# Patient Record
Sex: FEMALE | Race: OTHER | NOT HISPANIC OR LATINO
[De-identification: names, ages, dates, MRNs, and addresses within clinical notes are randomized per-mention and may not be internally consistent; named-entity substitution may affect disease eponyms.]

---

## 2019-06-01 PROBLEM — Z00.00 ENCOUNTER FOR PREVENTIVE HEALTH EXAMINATION: Status: ACTIVE | Noted: 2019-06-01

## 2019-06-04 ENCOUNTER — APPOINTMENT (OUTPATIENT)
Dept: ORTHOPEDIC SURGERY | Facility: CLINIC | Age: 39
End: 2019-06-04
Payer: MEDICAID

## 2019-06-04 VITALS — HEIGHT: 65 IN | WEIGHT: 145 LBS | BODY MASS INDEX: 24.16 KG/M2

## 2019-06-04 VITALS — DIASTOLIC BLOOD PRESSURE: 61 MMHG | HEART RATE: 72 BPM | SYSTOLIC BLOOD PRESSURE: 94 MMHG

## 2019-06-04 DIAGNOSIS — Z78.9 OTHER SPECIFIED HEALTH STATUS: ICD-10-CM

## 2019-06-04 DIAGNOSIS — M67.432 GANGLION, LEFT WRIST: ICD-10-CM

## 2019-06-04 PROCEDURE — 99204 OFFICE O/P NEW MOD 45 MIN: CPT

## 2019-06-05 PROBLEM — M67.432 GANGLION CYST OF DORSUM OF LEFT WRIST: Status: ACTIVE | Noted: 2019-06-04

## 2019-06-05 NOTE — ASSESSMENT
[FreeTextEntry1] : 39F with L wrist pain likely 2/2 occult ganglion cyst.  Risks and benefits of conservative tx vs US guided aspiration vs surgical excision were discussed with the pt, at this time she would like to proceed with aspiration.  She will follow-up if this does not alleviate her symptoms.

## 2019-06-05 NOTE — CONSULT LETTER
[Consult Letter:] : I had the pleasure of evaluating your patient, [unfilled]. [Please see my note below.] : Please see my note below. [Consult Closing:] : Thank you very much for allowing me to participate in the care of this patient.  If you have any questions, please do not hesitate to contact me. [Sincerely,] : Sincerely, [FreeTextEntry3] : Dr. Fay Pack\par Orthopaedic Surgery\par Hand & Upper Extremity.\par

## 2019-06-05 NOTE — PHYSICAL EXAM
[Normal LUE] : Left Upper Extremity: No scars, rashes, lesions, ulcers, skin intact [Normal Finger/nose] : finger to nose coordination [de-identified] : diogor [de-identified] : L wrist :\par skin intact no swelling no erythema no eccymosis\par TTP at the SL interval, no palpable mass\par ROM intact, pain with full wrist extension\par No TTP at the snuffbox or fovea\par ROM digits intact\par sensation intact, capillary refill < 2 sec [Normal] : no peripheral adenopathy appreciated [de-identified] : MRI - partial SL ligament tear with dorsal ganglion cyst

## 2019-06-05 NOTE — HISTORY OF PRESENT ILLNESS
[FreeTextEntry1] : 39F with several months left dorsal wrist pain, no trauma or inciting event.  Her pain is dull in nature localized to the SL interval, worse with activity and improved with rest.  She had a recent MRI which was positive for occult ganglion cyst at the dorsal SL interval, no full thickness tear.

## 2019-06-30 ENCOUNTER — TRANSCRIPTION ENCOUNTER (OUTPATIENT)
Age: 39
End: 2019-06-30

## 2019-07-01 ENCOUNTER — RESULT REVIEW (OUTPATIENT)
Age: 39
End: 2019-07-01

## 2019-07-01 ENCOUNTER — OUTPATIENT (OUTPATIENT)
Dept: OUTPATIENT SERVICES | Facility: HOSPITAL | Age: 39
LOS: 1 days | End: 2019-07-01
Payer: MEDICAID

## 2019-07-01 DIAGNOSIS — K29.60 OTHER GASTRITIS WITHOUT BLEEDING: ICD-10-CM

## 2019-07-01 DIAGNOSIS — K21.9 GASTRO-ESOPHAGEAL REFLUX DISEASE WITHOUT ESOPHAGITIS: ICD-10-CM

## 2019-07-01 DIAGNOSIS — Z86.010 PERSONAL HISTORY OF COLONIC POLYPS: ICD-10-CM

## 2019-07-01 LAB — HCG UR QL: NEGATIVE — SIGNIFICANT CHANGE UP

## 2019-07-01 PROCEDURE — 88313 SPECIAL STAINS GROUP 2: CPT

## 2019-07-01 PROCEDURE — 88312 SPECIAL STAINS GROUP 1: CPT | Mod: 26

## 2019-07-01 PROCEDURE — 88312 SPECIAL STAINS GROUP 1: CPT

## 2019-07-01 PROCEDURE — 81025 URINE PREGNANCY TEST: CPT

## 2019-07-01 PROCEDURE — 88313 SPECIAL STAINS GROUP 2: CPT | Mod: 26

## 2019-07-01 PROCEDURE — 88305 TISSUE EXAM BY PATHOLOGIST: CPT

## 2019-07-01 PROCEDURE — 45380 COLONOSCOPY AND BIOPSY: CPT | Mod: PT

## 2019-07-01 PROCEDURE — 43239 EGD BIOPSY SINGLE/MULTIPLE: CPT

## 2019-07-01 PROCEDURE — 88305 TISSUE EXAM BY PATHOLOGIST: CPT | Mod: 26

## 2019-07-02 LAB — SURGICAL PATHOLOGY STUDY: SIGNIFICANT CHANGE UP

## 2019-07-08 ENCOUNTER — APPOINTMENT (OUTPATIENT)
Dept: ORTHOPEDIC SURGERY | Facility: CLINIC | Age: 39
End: 2019-07-08
Payer: MEDICAID

## 2019-07-08 VITALS
DIASTOLIC BLOOD PRESSURE: 72 MMHG | HEIGHT: 65 IN | HEART RATE: 76 BPM | WEIGHT: 145 LBS | SYSTOLIC BLOOD PRESSURE: 106 MMHG | BODY MASS INDEX: 24.16 KG/M2

## 2019-07-08 PROCEDURE — 73562 X-RAY EXAM OF KNEE 3: CPT | Mod: 50

## 2019-07-08 PROCEDURE — 99214 OFFICE O/P EST MOD 30 MIN: CPT

## 2019-07-08 NOTE — DISCUSSION/SUMMARY
[de-identified] : Today I had a lengthy discussion with the patient regarding their BL knee pain.I have addressed all the patient's concerns surrounding the pathology of their condition. The patient should continue to stretch and utilize anti-inflammatory medicines such as Motrin, Advil, and Aleve 3 times a day. I recommend the patient undergo a course of physical therapy for the BL knees 2-3 times a week for a total of 6-8 weeks. A prescription was given for the physical therapy today. A discussion was had about a possible future cortisone injection if there is no improvement after physical therapy. I would like to see the patient back in the office in 6-8 weeks to reassess their condition.  The patient understood and verbally agreed to the treatment plan. All of their questions were answered and they were satisfied with the visit. The patient should call the office if they have any questions or experience worsening symptoms. \par

## 2019-07-08 NOTE — CONSULT LETTER
[Consult Letter:] : I had the pleasure of evaluating your patient, [unfilled]. [Consult Closing:] : Thank you very much for allowing me to participate in the care of this patient.  If you have any questions, please do not hesitate to contact me. [Sincerely,] : Sincerely, [Please see my note below.] : Please see my note below. [FreeTextEntry3] : Alejandro Ortiz, DO\par Foot and Ankle Surgery\par

## 2019-07-08 NOTE — PHYSICAL EXAM
[de-identified] : General: Alert and oriented x3. In no acute distress. Pleasant in nature with a normal affect. No apparent respiratory distress.\par \par L Knee Exam\par \par Skin: Clean, dry, intact\par Inspection: No obvious malalignment, no masses, no swelling, no effusion\par Pulses: 2+ DP/PT pulses\par ROM: Left 0-130 degrees of flexion. No pain with deep knee flexion. \par Tenderness: No MJLT. No LJLT. No pain over the patella facets. No pain to the quadriceps mechanism.\par Stability: Stable to varus, valgus, lachman testing. Negative anterior/posterior drawer.\par Strength: 5/5 Q/H/TA/GS/EHL, no atrophy\par Neuro: In tact to light touch throughout, DTR's normal\par Additional tests: Negative McMurrays test, Negative patellar grind test.\par \par R Knee Exam\par \par Skin: Clean, dry, intact\par Inspection: No obvious malalignment, no masses, no swelling, no effusion\par Pulses: 2+ DP/PT pulses\par ROM: Right 0-130 degrees of flexion. No pain with deep knee flexion. \par Tenderness: No MJLT. No LJLT. No pain over the patella facets. No pain to the quadriceps mechanism.\par Stability: Stable to varus, valgus, lachman testing. Negative anterior/posterior drawer.\par Strength: 5/5 Q/H/TA/GS/EHL, no atrophy\par Neuro: In tact to light touch throughout, DTR's normal\par Additional tests: Negative McMurrays test, Negative patellar grind test.\par \par \par \par   [de-identified] : 3V of the BL knee were ordered obtained and reviewed by me today, 07/08/2019 , revealed: no fx\par \par \par

## 2019-07-08 NOTE — ADDENDUM
[FreeTextEntry1] : I, Low Isiah, acted solely as a scribe for Dr. Alejandro Ortiz on this date 07/08/2019 .\par All medical record entries made by the Scribe were at my, Dr. Alejandro Ortiz, direction and personally dictated by me on 07/08/2019 . I have reviewed the chart and agree that the record accurately reflects my personal performance of the history, physical exam, assessment and plan. I have also personally directed, reviewed, and agreed with the chart.\par \par \par

## 2019-07-08 NOTE — HISTORY OF PRESENT ILLNESS
[de-identified] : 39 year year old female presenting with BL knee pain. The patient’s pain is noted to be a 6-7/10. The pain is noted to be worse in the right knee. The patient states her pain is been occurring for over a year. The patient is accompanied by her , mother, and three daughters. She is currently taking no pain medication. No other complaints at this time. \par

## 2021-02-03 ENCOUNTER — APPOINTMENT (OUTPATIENT)
Dept: ORTHOPEDIC SURGERY | Facility: CLINIC | Age: 41
End: 2021-02-03
Payer: MEDICAID

## 2021-02-03 DIAGNOSIS — M54.5 LOW BACK PAIN: ICD-10-CM

## 2021-02-03 DIAGNOSIS — M25.562 PAIN IN RIGHT KNEE: ICD-10-CM

## 2021-02-03 DIAGNOSIS — M22.2X1 PATELLOFEMORAL DISORDERS, RIGHT KNEE: ICD-10-CM

## 2021-02-03 DIAGNOSIS — M22.2X2 PATELLOFEMORAL DISORDERS, RIGHT KNEE: ICD-10-CM

## 2021-02-03 DIAGNOSIS — M25.561 PAIN IN RIGHT KNEE: ICD-10-CM

## 2021-02-03 PROCEDURE — 99072 ADDL SUPL MATRL&STAF TM PHE: CPT

## 2021-02-03 PROCEDURE — 72100 X-RAY EXAM L-S SPINE 2/3 VWS: CPT

## 2021-02-03 PROCEDURE — 73562 X-RAY EXAM OF KNEE 3: CPT | Mod: RT

## 2021-02-03 PROCEDURE — 73610 X-RAY EXAM OF ANKLE: CPT | Mod: RT

## 2021-02-03 PROCEDURE — 99214 OFFICE O/P EST MOD 30 MIN: CPT

## 2021-02-03 RX ORDER — AZITHROMYCIN 250 MG/1
250 TABLET, FILM COATED ORAL
Qty: 6 | Refills: 0 | Status: ACTIVE | COMMUNITY
Start: 2021-01-01

## 2021-02-03 RX ORDER — MUPIROCIN 20 MG/G
2 OINTMENT TOPICAL
Qty: 22 | Refills: 0 | Status: ACTIVE | COMMUNITY
Start: 2021-01-02

## 2021-02-03 NOTE — ADDENDUM
[FreeTextEntry1] : I, Low Joyce, acted solely as a scribe for Dr. Alejandro Ortiz on this date 02/03/2021 .\par All medical record entries made by the Scribe were at my, Dr. Alejandro Ortiz, direction and personally dictated by me on 02/03/2021 . I have reviewed the chart and agree that the record accurately reflects my personal performance of the history, physical exam, assessment and plan. I have also personally directed, reviewed, and agreed with the chart.

## 2021-02-03 NOTE — PHYSICAL EXAM
[de-identified] : General: Alert and oriented x3. In no acute distress. Pleasant in nature with a normal affect. No apparent respiratory distress.\par \par R Knee Exam\par Skin: Clean, dry, intact\par Inspection: No obvious malalignment, no masses, no swelling, no effusion\par Pulses: 2+ DP/PT pulses\par ROM: R Knee 0-130 degrees of flexion. No pain with deep knee flexion.\par Tenderness: No MJLT. No LJLT. No pain over the patella facets. No pain to the quadriceps mechanism.\par Stability: Stable to varus, valgus, lachman testing. Negative anterior/posterior drawer.\par Strength: 5/5 Q/H/TA/GS/EHL, no atrophy\par Neuro: In tact to light touch throughout, DTR's normal\par Additional tests: Negative McMurrays test, Negative patellar grind test.  [de-identified] : 3V of the right knee were ordered obtained and reviewed by me today, 02/03/2021 , revealed: No significant abnormality\par \par 3V of the right ankle were ordered obtained and reviewed by me today, 02/03/2021 , revealed: No significant abnormality\par \par 2V of the lumbar spine were ordered obtained and reviewed by me today, 02/03/2021 , revealed: No significant abnormality

## 2021-02-03 NOTE — DISCUSSION/SUMMARY
[de-identified] : Today I had a lengthy discussion with the patient regarding their right knee pain. I have addressed all the patient's concerns surrounding the pathology of their condition. I recommend the patient undergo a course of physical therapy for the right knee 2-3 times a week for a total of 6-8 weeks. A prescription was given for the physical therapy today. I recommend that the patient utilize 15 mg meloxicam with food once per day as instructed. A prescription for the meloxicam was ordered for the patient in the office today. I advised the patient to follow up with a physiatrist as well for further evaluation. I would like to see the patient back in the office in 2-3 months to reassess their condition. The patient understood and verbally agreed to the treatment plan. All of their questions were answered and they were satisfied with the visit. The patient should call the office if they have any questions or experience worsening symptoms.

## 2021-02-03 NOTE — HISTORY OF PRESENT ILLNESS
[de-identified] : 40 year old female presenting with right knee pain. The patient is accompanied by her . The patient’s pain is noted to be a 8/10. The pain is noted to be worse compared to her previous visit. The patient cannot attribute their pain to any specific injury, fall, or trauma. She c/o cramping, and pain radiating down her lower back/buttock to her ankle. She reports a burning sensation in the ankle. She is currently taking no pain medication. No other complaints at this time.

## 2021-02-25 ENCOUNTER — RX RENEWAL (OUTPATIENT)
Age: 41
End: 2021-02-25

## 2021-02-25 RX ORDER — MELOXICAM 15 MG/1
15 TABLET ORAL
Qty: 30 | Refills: 0 | Status: ACTIVE | COMMUNITY
Start: 2021-02-03 | End: 1900-01-01

## 2021-03-24 ENCOUNTER — RX RENEWAL (OUTPATIENT)
Age: 41
End: 2021-03-24

## 2024-03-28 ENCOUNTER — APPOINTMENT (OUTPATIENT)
Dept: ORTHOPEDIC SURGERY | Facility: CLINIC | Age: 44
End: 2024-03-28
Payer: MEDICAID

## 2024-03-28 VITALS — HEIGHT: 65 IN | WEIGHT: 177 LBS | BODY MASS INDEX: 29.49 KG/M2

## 2024-03-28 DIAGNOSIS — M23.91 UNSPECIFIED INTERNAL DERANGEMENT OF RIGHT KNEE: ICD-10-CM

## 2024-03-28 PROCEDURE — 99204 OFFICE O/P NEW MOD 45 MIN: CPT

## 2024-03-28 PROCEDURE — 73564 X-RAY EXAM KNEE 4 OR MORE: CPT | Mod: RT

## 2024-03-28 NOTE — IMAGING
[de-identified] : RIGHT KNEE No Effusion +Medial joint line tenderness +PF tenderness ROM 0-120 5/5 Strength NVI  Non-antalgic gait w/o assistance

## 2024-03-28 NOTE — HISTORY OF PRESENT ILLNESS
[8] : 8 [Intermittent] : intermittent [Rest] : rest [Meds] : meds [Walking] : walking [Full time] : Work status: full time [de-identified] : 3/28/24: 44yo F with right knee pain that has been gradually worsening over the past few months with no injury. She states she went to ortho ~7 yrs ago and was told she had OA. No prior inj. Had done HEP and meds.  [] : no [FreeTextEntry1] : Right Knee [FreeTextEntry5] : Approx 7yrs ago went to Orthopedic and was told her Rt. Knee is bone on bone, pt doesn't recall where she went to [FreeTextEntry6] : Popping noise [FreeTextEntry9] : Tylenol [de-identified] : Kylee

## 2024-03-28 NOTE — ASSESSMENT
[FreeTextEntry1] : 43F p/w R knee internal derangement  f/u MRI R knee Continue nsaids for pain return after imaging   The patient was advised of the diagnosis. The natural history of the pathology was explained in full to the patient in layman's terms. All questions were answered. The risks and benefits of surgical and non-surgical treatment alternatives were explained in full to the patient.

## 2024-03-28 NOTE — DISCUSSION/SUMMARY
[de-identified] : The patient's current medication management of their orthopedic diagnosis was reviewed today:   (1) We discussed a comprehensive treatment plan that included possible pharmaceutical management involving the use of prescription strength medications including but not limited to options such as oral Naprosyn 500mg BID, once daily Meloxicam 15 mg, or 500-650 mg Tylenol versus over the counter oral medications and topical prescription NSAID Pennsaid vs over the counter Voltaren gel.   (2) There is a moderate risk of morbidity with further treatment, especially from use of prescription strength medications and possible side effects of these medications which include upset stomach with oral medications, skin reactions to topical medications and cardiac/renal issues with long term use.   (3) I recommended that the patient follow-up with their medical physician to discuss any significant specific potential issues with long term medication use such as interactions with current medications or with exacerbation of underlying medical comorbidities.   (4) The benefits and risks associated with use of injectable, oral or topical, prescription and over the counter anti-inflammatory medications were discussed with the patient. The patient voiced understanding of the risks including but not limited to bleeding, stroke, kidney dysfunction, heart disease, and were referred to the black box warning label for further information.  Prior to appointment and during encounter with patient extensive medical records were reviewed including but not limited to, hospital records, out patient records, imaging results, and lab data. During this appointment the patient was examined, diagnoses were discussed and explained in a face to face manner. In addition extensive time was spent reviewing aforementioned diagnostic studies. Counseling including abnormal image results, differential diagnoses, treatment options, risk and benefits, lifestyle changes, current condition, and current medications was performed. Patient's comments, questions, and concerns were address and patient verbalized understanding.

## 2024-04-08 ENCOUNTER — APPOINTMENT (OUTPATIENT)
Dept: MRI IMAGING | Facility: CLINIC | Age: 44
End: 2024-04-08

## 2024-04-11 ENCOUNTER — APPOINTMENT (OUTPATIENT)
Dept: ORTHOPEDIC SURGERY | Facility: CLINIC | Age: 44
End: 2024-04-11

## 2024-04-18 ENCOUNTER — APPOINTMENT (OUTPATIENT)
Dept: ORTHOPEDIC SURGERY | Facility: CLINIC | Age: 44
End: 2024-04-18